# Patient Record
Sex: FEMALE | Race: BLACK OR AFRICAN AMERICAN | NOT HISPANIC OR LATINO | URBAN - METROPOLITAN AREA
[De-identification: names, ages, dates, MRNs, and addresses within clinical notes are randomized per-mention and may not be internally consistent; named-entity substitution may affect disease eponyms.]

---

## 2022-06-30 ENCOUNTER — OFFICE VISIT (OUTPATIENT)
Dept: URBAN - METROPOLITAN AREA CLINIC 115 | Facility: CLINIC | Age: 57
End: 2022-06-30

## 2022-06-30 VITALS — HEIGHT: 63 IN

## 2022-06-30 RX ORDER — FLUTICASONE PROPIONATE 50 UG/1
SPRAY, METERED NASAL
Qty: 0 | Refills: 0 | Status: ACTIVE | COMMUNITY
Start: 1900-01-01

## 2022-09-06 ENCOUNTER — DASHBOARD ENCOUNTERS (OUTPATIENT)
Age: 57
End: 2022-09-06

## 2022-09-06 ENCOUNTER — OFFICE VISIT (OUTPATIENT)
Dept: URBAN - METROPOLITAN AREA CLINIC 115 | Facility: CLINIC | Age: 57
End: 2022-09-06
Payer: OTHER GOVERNMENT

## 2022-09-06 ENCOUNTER — WEB ENCOUNTER (OUTPATIENT)
Dept: URBAN - METROPOLITAN AREA CLINIC 115 | Facility: CLINIC | Age: 57
End: 2022-09-06

## 2022-09-06 VITALS
WEIGHT: 202 LBS | TEMPERATURE: 97.1 F | BODY MASS INDEX: 35.79 KG/M2 | HEART RATE: 95 BPM | DIASTOLIC BLOOD PRESSURE: 100 MMHG | HEIGHT: 63 IN | SYSTOLIC BLOOD PRESSURE: 155 MMHG

## 2022-09-06 DIAGNOSIS — K59.09 CHRONIC CONSTIPATION: ICD-10-CM

## 2022-09-06 DIAGNOSIS — R11.0 NAUSEA: ICD-10-CM

## 2022-09-06 DIAGNOSIS — R10.33 PERIUMBILICAL PAIN: ICD-10-CM

## 2022-09-06 DIAGNOSIS — E66.9 OBESITY (BMI 30-39.9): ICD-10-CM

## 2022-09-06 PROBLEM — 162864005: Status: ACTIVE | Noted: 2022-09-06

## 2022-09-06 PROCEDURE — 99214 OFFICE O/P EST MOD 30 MIN: CPT | Performed by: INTERNAL MEDICINE

## 2022-09-06 RX ORDER — PANTOPRAZOLE SODIUM 40 MG/1
1 TABLET TABLET, DELAYED RELEASE ORAL ONCE A DAY
Qty: 90 | Refills: 3 | OUTPATIENT

## 2022-09-06 RX ORDER — FLUTICASONE PROPIONATE 50 UG/1
SPRAY, METERED NASAL
Qty: 0 | Refills: 0 | Status: ACTIVE | COMMUNITY
Start: 1900-01-01

## 2022-09-06 NOTE — HPI-TODAY'S VISIT:
EGD '19 grade A reflux, gas sleeve, colonoscopy '17 nml, reviewed. Labs and prior CT unremarkable, last seen 2020. Int nausea, periumb pain, hx gas sleeve, denies nsaids, using pantop QOD. 30% constipation, nausea only in AM, surrounding AM BM.

## 2022-09-29 ENCOUNTER — TELEPHONE ENCOUNTER (OUTPATIENT)
Dept: URBAN - METROPOLITAN AREA CLINIC 115 | Facility: CLINIC | Age: 57
End: 2022-09-29

## 2022-10-12 ENCOUNTER — OFFICE VISIT (OUTPATIENT)
Dept: URBAN - METROPOLITAN AREA SURGERY CENTER 13 | Facility: SURGERY CENTER | Age: 57
End: 2022-10-12
Payer: OTHER GOVERNMENT

## 2022-10-12 DIAGNOSIS — R11.0 NAUSEA: ICD-10-CM

## 2022-10-12 DIAGNOSIS — K20.0 EOSINOPHILIC ESOPHAGITIS: ICD-10-CM

## 2022-10-12 DIAGNOSIS — R10.33 PERIUMBILICAL PAIN: ICD-10-CM

## 2022-10-12 DIAGNOSIS — K29.30 CHRONIC SUPERFICIAL GASTRITIS: ICD-10-CM

## 2022-10-12 PROCEDURE — 43239 EGD BIOPSY SINGLE/MULTIPLE: CPT | Performed by: INTERNAL MEDICINE

## 2022-10-12 PROCEDURE — G8907 PT DOC NO EVENTS ON DISCHARG: HCPCS | Performed by: INTERNAL MEDICINE

## 2022-11-01 ENCOUNTER — TELEPHONE ENCOUNTER (OUTPATIENT)
Dept: URBAN - METROPOLITAN AREA CLINIC 115 | Facility: CLINIC | Age: 57
End: 2022-11-01

## 2022-11-01 PROBLEM — 235599003: Status: ACTIVE | Noted: 2022-11-01
